# Patient Record
Sex: MALE | Race: WHITE | NOT HISPANIC OR LATINO | Employment: FULL TIME | URBAN - METROPOLITAN AREA
[De-identification: names, ages, dates, MRNs, and addresses within clinical notes are randomized per-mention and may not be internally consistent; named-entity substitution may affect disease eponyms.]

---
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
## 2024-03-26 ENCOUNTER — APPOINTMENT (OUTPATIENT)
Dept: CT IMAGING | Facility: CLINIC | Age: 19
End: 2024-03-26
Attending: EMERGENCY MEDICINE
Payer: COMMERCIAL

## 2024-03-26 ENCOUNTER — HOSPITAL ENCOUNTER (EMERGENCY)
Facility: CLINIC | Age: 19
Discharge: HOME | End: 2024-03-26
Attending: EMERGENCY MEDICINE | Admitting: EMERGENCY MEDICINE
Payer: COMMERCIAL

## 2024-03-26 VITALS
OXYGEN SATURATION: 97 % | DIASTOLIC BLOOD PRESSURE: 85 MMHG | TEMPERATURE: 97.8 F | SYSTOLIC BLOOD PRESSURE: 136 MMHG | BODY MASS INDEX: 26.23 KG/M2 | RESPIRATION RATE: 18 BRPM | HEART RATE: 66 BPM | WEIGHT: 177.6 LBS

## 2024-03-26 DIAGNOSIS — S06.0X0A CONCUSSION WITHOUT LOSS OF CONSCIOUSNESS, INITIAL ENCOUNTER: ICD-10-CM

## 2024-03-26 PROCEDURE — 70450 CT HEAD/BRAIN W/O DYE: CPT

## 2024-03-26 PROCEDURE — 99284 EMERGENCY DEPT VISIT MOD MDM: CPT | Performed by: EMERGENCY MEDICINE

## 2024-03-26 PROCEDURE — 99283 EMERGENCY DEPT VISIT LOW MDM: CPT | Performed by: EMERGENCY MEDICINE

## 2024-03-26 ASSESSMENT — ACTIVITIES OF DAILY LIVING (ADL): ADLS_ACUITY_SCORE: 35

## 2024-03-26 ASSESSMENT — COLUMBIA-SUICIDE SEVERITY RATING SCALE - C-SSRS
6. HAVE YOU EVER DONE ANYTHING, STARTED TO DO ANYTHING, OR PREPARED TO DO ANYTHING TO END YOUR LIFE?: NO
2. HAVE YOU ACTUALLY HAD ANY THOUGHTS OF KILLING YOURSELF IN THE PAST MONTH?: NO
1. IN THE PAST MONTH, HAVE YOU WISHED YOU WERE DEAD OR WISHED YOU COULD GO TO SLEEP AND NOT WAKE UP?: NO

## 2024-03-26 NOTE — LETTER
March 26, 2024      To Whom It May Concern:      Tito Andrade was seen in our Emergency Department today, 03/26/24.  I expect his condition to improve over the next 5 days.  He may return to work when improved.    Sincerely,        Brett Cordova MD

## 2024-03-27 NOTE — ED PROVIDER NOTES
History     Chief Complaint   Patient presents with    Head Injury     HPI  Tito Andrade is a 19 year old male who presents to the emergency department secondary to head injury after hitting head on an auger at work.  This happened at 1030 this morning and it is now 9:30 at night.  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  It has a large casing around the auger and he was moving fast and turned around and hit the front of his head on the auger.  It was a sharp edge but rather flat edge.  He did not sustain a laceration.  He did not have any symptoms prior to hitting his head but afterwards he developed multiple symptoms.  He has had nausea and vomiting sent to, headache and dizziness.  He has a history of multiple concussions when he was younger.  No neck pain no numbness or tingling of all 4 extremities.  He is able to walk but he is a little bit dizzy.  No confusion.  No loss of consciousness.    Allergies:  No Known Allergies    Problem List:    Patient Active Problem List    Diagnosis Date Noted    Pseudophakia of left eye 04/16/2014     Priority: Medium    XT (exotropia) 04/16/2014     Priority: Medium    Posterior capsular opacification visually significant 04/16/2014     Priority: Medium    Trauma 08/28/2012     Priority: Medium    Ruptured globe 08/28/2012     Priority: Medium        Past Medical History:    Past Medical History:   Diagnosis Date    Eye injury - globe rupture        Past Surgical History:    Past Surgical History:   Procedure Laterality Date    EXAM UNDER ANESTHESIA EYE(S)  9/11/2012    Procedure: EXAM UNDER ANESTHESIA EYE(S);;  Surgeon: Sanjiv Ceron MD;  [facility]: [facility] OR    EXAM UNDER ANESTHESIA EYE(S)  10/30/2012    Procedure: EXAM UNDER ANESTHESIA EYE(S);  Eye Exam both eyes, Left Lensectomy, Implant Intraocular Lens in left eye;  Surgeon: Sanjiv Ceron MD;  [facility]    IMPLANT SECONDARY INTRAOCULAR LENS IMPLANT  10/30/2012     Procedure: IMPLANT SECONDARY INTRAOCULAR LENS IMPLANT;;  Surgeon: Sanjiv Ceron MD;  [facility]    LENSECTOMY CHILD  10/30/2012    Procedure: LENSECTOMY CHILD;;  Surgeon: Sanjiv Ceron MD;  [facility]    MOUTH SURGERY      REMOVE SUTURE EYE POST PROCEDURE  9/11/2012    Procedure: REMOVE SUTURE EYE POST PROCEDURE;;  Surgeon: Sanjiv Ceron MD;  [facility]    REPAIR RUPTURED GLOBE  8/27/2012    Procedure: REPAIR RUPTURED GLOBE;  Ruptured globe, repair eye.;  Surgeon: Barahimi, Behin, MD;  [facility]: [facility]    TYMPANOPLASTY CHILD         Family History:    No family history on file.    Social History:  Marital Status:  Single [1]  Social History     Tobacco Use    Smoking status: Never   Substance Use Topics    Alcohol use: No    Drug use: No        Medications:    meloxicam (MOBIC) 15 MG tablet          Review of Systems   All other systems reviewed and are negative.      Physical Exam   BP: 136/85  Pulse: 66  Temp: 97.8  F (36.6  C)  Resp: 18  Weight: 80.6 kg (177 lb 9.6 oz)  SpO2: 97 %      Physical Exam  Vitals and nursing note reviewed.   Constitutional:       General: He is not in acute distress.     Appearance: He is well-developed. He is not diaphoretic.   HENT:      Head: Normocephalic and atraumatic.      Right Ear: External ear normal.      Left Ear: External ear normal.      Nose: No congestion.      Mouth/Throat:      Mouth: Mucous membranes are moist.   Eyes:      General: No scleral icterus.     Conjunctiva/sclera: Conjunctivae normal.      Pupils: Pupils are equal, round, and reactive to light.      Comments: Scarring from previous corneal injury, left eye   Cardiovascular:      Rate and Rhythm: Normal rate and regular rhythm.   Pulmonary:      Effort: Pulmonary effort is normal.   Abdominal:      General: Abdomen is flat.   Musculoskeletal:         General: Normal range of motion.      Cervical back: Normal range of motion and neck supple.      Right lower leg: No edema.      Left  lower leg: No edema.   Skin:     General: Skin is warm and dry.      Capillary Refill: Capillary refill takes less than 2 seconds.      Findings: No rash.   Neurological:      General: No focal deficit present.      Mental Status: He is alert and oriented to person, place, and time.   Psychiatric:         Mood and Affect: Mood normal.         Behavior: Behavior normal.         ED Course        Procedures                Results for orders placed or performed during the hospital encounter of 03/26/24 (from the past 24 hour(s))   Head CT w/o contrast    Narrative    EXAM: CT HEAD W/O CONTRAST  LOCATION: [facility]  DATE: 3/26/2024    INDICATION: Head injury, vomiting, dizziness.  COMPARISON: None.  TECHNIQUE: Routine CT Head without IV contrast. Multiplanar reformats. Dose reduction techniques were used.    FINDINGS:  INTRACRANIAL CONTENTS: No intracranial hemorrhage, extraaxial collection, or mass effect.  No CT evidence of acute infarct. Normal parenchymal attenuation. Normal ventricles and sulci.     VISUALIZED ORBITS/SINUSES/MASTOIDS: No intraorbital abnormality. Mild mucosal thickening in the bilateral ethmoid sinuses. No middle ear or mastoid effusion.    BONES/SOFT TISSUES: No acute abnormality.      Impression    IMPRESSION: No CT findings of acute intracranial process.       Medications - No data to display    Assessments & Plan (with Medical Decision Making)  19-year-old with a head injury and postconcussive symptoms of nausea vomiting headache dizziness.  CT scan of the head ordered and results are reviewed.  No indication for blood work or other imaging at this time.  CT scan is unremarkable.  Patient is stable here in the emergency department.  He was referred to concussion clinic.  Concussion care was discussed.  He was given a work note.  Return to ER precautions and follow-up precautions discussed.  All questions answered prior to discharge.     I have reviewed the  nursing notes.    I have reviewed the findings, diagnosis, plan and need for follow up with the patient.          Discharge Medication List as of 3/26/2024 10:06 PM          Final diagnoses:   Concussion without loss of consciousness, initial encounter       3/26/2024   [facility] EMERGENCY DEPT       Brett Cordova MD  03/26/24 9575

## 2024-03-27 NOTE — DISCHARGE INSTRUCTIONS
"-you have had a concussion  -fortunately the head CT was normal  -I recommend \"brain rest\" which means no doing things that require sustained concentration for extended periods of time such as reading, looking at screens, etc.  -get plenty of rest, this allows the brain to heal.  -I put in a referral to concussion clinic for you to have follow-up.  -it was a pleasure to meet you.     "

## 2024-03-27 NOTE — ED TRIAGE NOTES
Patient presents with head injury after hitting head on auger at work. Patient is nauseous and vomiting x 2, headache, and dizziness. Patient has hx of multiple concussions.

## 2024-06-23 ENCOUNTER — HEALTH MAINTENANCE LETTER (OUTPATIENT)
Age: 19
End: 2024-06-23

## 2024-06-29 ENCOUNTER — HOSPITAL ENCOUNTER (EMERGENCY)
Facility: CLINIC | Age: 19
Discharge: HOME | End: 2024-06-29
Attending: EMERGENCY MEDICINE | Admitting: EMERGENCY MEDICINE
Payer: MEDICAID

## 2024-06-29 VITALS
RESPIRATION RATE: 18 BRPM | WEIGHT: 178 LBS | TEMPERATURE: 98 F | SYSTOLIC BLOOD PRESSURE: 131 MMHG | DIASTOLIC BLOOD PRESSURE: 69 MMHG | OXYGEN SATURATION: 99 % | BODY MASS INDEX: 26.29 KG/M2 | HEART RATE: 70 BPM

## 2024-06-29 DIAGNOSIS — S05.02XA ABRASION OF LEFT CORNEA, INITIAL ENCOUNTER: ICD-10-CM

## 2024-06-29 PROCEDURE — 250N000009 HC RX 250: Performed by: EMERGENCY MEDICINE

## 2024-06-29 PROCEDURE — 99283 EMERGENCY DEPT VISIT LOW MDM: CPT

## 2024-06-29 PROCEDURE — 99284 EMERGENCY DEPT VISIT MOD MDM: CPT | Performed by: EMERGENCY MEDICINE

## 2024-06-29 RX ORDER — TETRACAINE HYDROCHLORIDE 5 MG/ML
1-2 SOLUTION OPHTHALMIC ONCE
Status: COMPLETED | OUTPATIENT
Start: 2024-06-29 | End: 2024-06-29

## 2024-06-29 RX ORDER — TOBRAMYCIN 3 MG/ML
1-2 SOLUTION/ DROPS OPHTHALMIC
Qty: 5 ML | Refills: 0 | Status: SHIPPED | OUTPATIENT
Start: 2024-06-29 | End: 2024-07-06

## 2024-06-29 RX ADMIN — TETRACAINE HYDROCHLORIDE 2 DROP: 5 SOLUTION OPHTHALMIC at 14:56

## 2024-06-29 ASSESSMENT — COLUMBIA-SUICIDE SEVERITY RATING SCALE - C-SSRS
1. IN THE PAST MONTH, HAVE YOU WISHED YOU WERE DEAD OR WISHED YOU COULD GO TO SLEEP AND NOT WAKE UP?: NO
6. HAVE YOU EVER DONE ANYTHING, STARTED TO DO ANYTHING, OR PREPARED TO DO ANYTHING TO END YOUR LIFE?: NO
2. HAVE YOU ACTUALLY HAD ANY THOUGHTS OF KILLING YOURSELF IN THE PAST MONTH?: NO

## 2024-06-29 ASSESSMENT — ACTIVITIES OF DAILY LIVING (ADL): ADLS_ACUITY_SCORE: 35

## 2024-06-29 NOTE — ED TRIAGE NOTES
Pt presents with concern for left eye injury after he was wrestling with his dog last night. He states the dogs claw got behind his glasses and scratched the inside of his eyelid. Pt has hx of global rupture and just wanted to get his eye checked.

## 2024-06-29 NOTE — DISCHARGE INSTRUCTIONS
Return to the emergency department if you develop new or worsening symptoms.  You had a small abrasion of the cornea on the left side.  That should get better with time.  You can use the antibiotic drops which will help as well.  I hope you get better quickly.

## 2024-06-29 NOTE — ED PROVIDER NOTES
History     Chief Complaint   Patient presents with    Eye Injury     HPI  Tito Andrade is a 19 year old male who presents to the emergency department secondary to left upper eyelid injury that occurred last night when he was wrestling with his dog.  The dog's claw got in between his glasses and his eye and scratched the inside of his upper left upper eyelid.  He just wanted to get checked because he had an eye injury previously.  His vision has not changed.  He does not have a headache.  There is been no pustular discharge.    Allergies:  No Known Allergies    Problem List:    Patient Active Problem List    Diagnosis Date Noted    Pseudophakia of left eye 04/16/2014     Priority: Medium    XT (exotropia) 04/16/2014     Priority: Medium    Posterior capsular opacification visually significant 04/16/2014     Priority: Medium    Trauma 08/28/2012     Priority: Medium    Ruptured globe 08/28/2012     Priority: Medium        Past Medical History:    Past Medical History:   Diagnosis Date    Eye injury - globe rupture        Past Surgical History:    Past Surgical History:   Procedure Laterality Date    EXAM UNDER ANESTHESIA EYE(S)  9/11/2012    Procedure: EXAM UNDER ANESTHESIA EYE(S);;  Surgeon: Sanjiv Ceron MD;  [facility]    EXAM UNDER ANESTHESIA EYE(S)  10/30/2012    Procedure: EXAM UNDER ANESTHESIA EYE(S);  Eye Exam both eyes, Left Lensectomy, Implant Intraocular Lens in left eye;  Surgeon: Sanjiv Ceron MD;  [facility]    IMPLANT SECONDARY INTRAOCULAR LENS IMPLANT  10/30/2012    Procedure: IMPLANT SECONDARY INTRAOCULAR LENS IMPLANT;;  Surgeon: Sanjiv Ceron MD;  [facility]    LENSECTOMY CHILD  10/30/2012    Procedure: LENSECTOMY CHILD;;  Surgeon: Sanjiv Ceron MD;  [facility]    MOUTH SURGERY      REMOVE SUTURE EYE POST PROCEDURE  9/11/2012    Procedure: REMOVE SUTURE EYE POST PROCEDURE;;  Surgeon: Sanjiv Ceron MD;  [facility]    REPAIR RUPTURED GLOBE  8/27/2012     Procedure: REPAIR RUPTURED GLOBE;  Ruptured globe, repair eye.;  Surgeon: Barahimi, Behin, MD;  [facility]    TYMPANOPLASTY CHILD         Family History:    No family history on file.    Social History:  Marital Status:  Single [1]  Social History     Tobacco Use    Smoking status: Never   Substance Use Topics    Alcohol use: No    Drug use: No        Medications:    tobramycin (TOBREX) 0.3 % ophthalmic solution  meloxicam (MOBIC) 15 MG tablet          Review of Systems   All other systems reviewed and are negative.      Physical Exam   BP: 131/69  Pulse: 70  Temp: 98  F (36.7  C)  Resp: 18  Weight: 80.7 kg (178 lb)  SpO2: 99 %      Physical Exam  Vitals and nursing note reviewed.   Constitutional:       General: He is not in acute distress.     Appearance: He is well-developed. He is not diaphoretic.   HENT:      Head: Normocephalic and atraumatic.      Right Ear: External ear normal.      Left Ear: External ear normal.      Nose: Nose normal. No congestion.      Mouth/Throat:      Mouth: Mucous membranes are moist.      Pharynx: Oropharynx is clear.   Eyes:      General: No scleral icterus.     Extraocular Movements: Extraocular movements intact.      Conjunctiva/sclera: Conjunctivae normal.      Comments: Slight left conjunctival injection.  Small linear scar over the middle of the left eye.  No foreign bodies.  No lacerations.  Abnormally shaped left pupil from previous traumatic eye injury.   Cardiovascular:      Rate and Rhythm: Normal rate.   Pulmonary:      Effort: Pulmonary effort is normal.   Abdominal:      General: Abdomen is flat.   Musculoskeletal:         General: No tenderness or deformity. Normal range of motion.      Cervical back: Normal range of motion and neck supple.   Lymphadenopathy:      Cervical: No cervical adenopathy.   Skin:     General: Skin is warm and dry.      Capillary Refill: Capillary refill takes less than 2 seconds.      Findings: No rash.   Neurological:      General: No  focal deficit present.      Mental Status: He is alert and oriented to person, place, and time.      Cranial Nerves: No cranial nerve deficit.      Sensory: No sensory deficit.      Coordination: Coordination normal.   Psychiatric:         Behavior: Behavior normal.         ED Course        Procedures      Tetracaine eyedrops were put in the patient's left eye.  Patient had excellent improvement of his symptoms with the drops.  The underside of the eyelid was inspected.  There is no foreign bodies or lacerations or significant abnormalities.  Fluorescein dye was placed on the eye and the patient was reexamined with  Woods lamp.  Patient had a small punctate corneal abrasion in the center of his eye.       No results found for this or any previous visit (from the past 24 hour(s)).    Medications   tetracaine (PONTOCAINE) 0.5 % ophthalmic solution 1-2 drop (2 drops Left Eye $Given 6/29/24 4669)       Assessments & Plan (with Medical Decision Making)  19-year-old with a left eye injury.  Tetracaine drops were infiltrated into the eye.  Fluorescein staining reveals a small corneal abrasion on the left eye.  Patient be discharged home with tobramycin eyedrops and follow-up precautions.  I did not see any new abnormalities on exam.  Patient does have a scar from previous traumatic eye injury with globe rupture and does have an abnormal shaped pupil which is not new.  There is no new disruption in his vision.  Patient is discharged home in stable condition.  All questions answered prior to discharge.     I have reviewed the nursing notes.    I have reviewed the findings, diagnosis, plan and need for follow up with the patient.        New Prescriptions    TOBRAMYCIN (TOBREX) 0.3 % OPHTHALMIC SOLUTION    Place 1-2 drops Into the left eye every 2 hours for 7 days       Final diagnoses:   Abrasion of left cornea, initial encounter       6/29/2024   [facility] EMERGENCY DEPT       Brett Cordova,  MD  06/29/24 1519

## 2024-07-01 ENCOUNTER — PATIENT OUTREACH (OUTPATIENT)
Dept: FAMILY MEDICINE | Facility: CLINIC | Age: 19
End: 2024-07-01
Payer: COMMERCIAL

## 2024-07-01 NOTE — TELEPHONE ENCOUNTER
Transitions of Care Outreach  Chief Complaint   Patient presents with    Hospital F/U       Most Recent Admission Date: 6/29/2024   Most Recent Admission Diagnosis:      Most Recent Discharge Date: 6/29/2024   Most Recent Discharge Diagnosis: Abrasion of left cornea, initial encounter - S05.02XA     Transitions of Care Assessment    Discharge Assessment  How are you doing now that you are home?: better  How are your symptoms? (Red Flag symptoms escalate to triage hotline per guidelines): Improved  Do you know how to contact your clinic care team if you have future questions or changes to your health status? : Yes  Does the patient have their discharge instructions? : Yes  Does the patient have questions regarding their discharge instructions? : No  Were you started on any new medications or were there changes to any of your previous medications? : Yes  Does the patient have all of their medications?: Yes  Do you have questions regarding any of your medications? : No  Do you have all of your needed medical supplies or equipment (DME)?  (i.e. oxygen tank, CPAP, cane, etc.): Yes    Follow up Plan     Discharge Follow-Up  Discharge follow up appointment scheduled in alignment with recommended follow up timeframe or Transitions of Risk Category? (Low = within 30 days; Moderate= within 14 days; High= within 7 days): No  Patient's follow up appointment not scheduled: Patient accepted scheduling support. Appt scheduled/requested per protocol.    No future appointments.    Outpatient Plan as outlined on AVS reviewed with patient.    For any urgent concerns, please contact our 24 hour nurse triage line: [phone] ([phone])       Ally Harvey RN

## 2024-09-11 ENCOUNTER — APPOINTMENT (OUTPATIENT)
Dept: GENERAL RADIOLOGY | Facility: CLINIC | Age: 19
End: 2024-09-11
Attending: STUDENT IN AN ORGANIZED HEALTH CARE EDUCATION/TRAINING PROGRAM
Payer: COMMERCIAL

## 2024-09-11 ENCOUNTER — HOSPITAL ENCOUNTER (EMERGENCY)
Facility: CLINIC | Age: 19
Discharge: HOME | End: 2024-09-11
Attending: STUDENT IN AN ORGANIZED HEALTH CARE EDUCATION/TRAINING PROGRAM | Admitting: STUDENT IN AN ORGANIZED HEALTH CARE EDUCATION/TRAINING PROGRAM
Payer: COMMERCIAL

## 2024-09-11 VITALS
BODY MASS INDEX: 26.06 KG/M2 | WEIGHT: 176.5 LBS | DIASTOLIC BLOOD PRESSURE: 71 MMHG | SYSTOLIC BLOOD PRESSURE: 119 MMHG | HEART RATE: 73 BPM | TEMPERATURE: 98.1 F | RESPIRATION RATE: 17 BRPM | OXYGEN SATURATION: 98 %

## 2024-09-11 DIAGNOSIS — R07.9 NONSPECIFIC CHEST PAIN: ICD-10-CM

## 2024-09-11 LAB
ALBUMIN SERPL BCG-MCNC: 4.3 G/DL (ref 3.5–5.2)
ALP SERPL-CCNC: 102 U/L (ref 40–150)
ALT SERPL W P-5'-P-CCNC: 31 U/L (ref 0–70)
ANION GAP SERPL CALCULATED.3IONS-SCNC: 16 MMOL/L (ref 7–15)
AST SERPL W P-5'-P-CCNC: 32 U/L (ref 0–45)
BASOPHILS # BLD AUTO: 0.1 10E3/UL (ref 0–0.2)
BASOPHILS NFR BLD AUTO: 1 %
BILIRUB SERPL-MCNC: 0.3 MG/DL
BUN SERPL-MCNC: 12 MG/DL (ref 6–20)
CALCIUM SERPL-MCNC: 9.7 MG/DL (ref 8.8–10.4)
CHLORIDE SERPL-SCNC: 104 MMOL/L (ref 98–107)
CREAT SERPL-MCNC: 0.78 MG/DL (ref 0.67–1.17)
EGFRCR SERPLBLD CKD-EPI 2021: >90 ML/MIN/1.73M2
EOSINOPHIL # BLD AUTO: 0.6 10E3/UL (ref 0–0.7)
EOSINOPHIL NFR BLD AUTO: 7 %
ERYTHROCYTE [DISTWIDTH] IN BLOOD BY AUTOMATED COUNT: 12.3 % (ref 10–15)
GLUCOSE SERPL-MCNC: 113 MG/DL (ref 70–99)
HCO3 SERPL-SCNC: 22 MMOL/L (ref 22–29)
HCT VFR BLD AUTO: 41 % (ref 40–53)
HGB BLD-MCNC: 14.2 G/DL (ref 13.3–17.7)
IMM GRANULOCYTES # BLD: 0 10E3/UL
IMM GRANULOCYTES NFR BLD: 0 %
LYMPHOCYTES # BLD AUTO: 3.7 10E3/UL (ref 0.8–5.3)
LYMPHOCYTES NFR BLD AUTO: 40 %
MCH RBC QN AUTO: 29.6 PG (ref 26.5–33)
MCHC RBC AUTO-ENTMCNC: 34.6 G/DL (ref 31.5–36.5)
MCV RBC AUTO: 85 FL (ref 78–100)
MONOCYTES # BLD AUTO: 0.7 10E3/UL (ref 0–1.3)
MONOCYTES NFR BLD AUTO: 7 %
NEUTROPHILS # BLD AUTO: 4.1 10E3/UL (ref 1.6–8.3)
NEUTROPHILS NFR BLD AUTO: 45 %
NRBC # BLD AUTO: 0 10E3/UL
NRBC BLD AUTO-RTO: 0 /100
PLATELET # BLD AUTO: 237 10E3/UL (ref 150–450)
POTASSIUM SERPL-SCNC: 3.8 MMOL/L (ref 3.4–5.3)
PROT SERPL-MCNC: 7.2 G/DL (ref 6.4–8.3)
RBC # BLD AUTO: 4.8 10E6/UL (ref 4.4–5.9)
SODIUM SERPL-SCNC: 142 MMOL/L (ref 135–145)
TROPONIN T SERPL HS-MCNC: <6 NG/L
WBC # BLD AUTO: 9.1 10E3/UL (ref 4–11)

## 2024-09-11 PROCEDURE — 93005 ELECTROCARDIOGRAM TRACING: CPT | Performed by: STUDENT IN AN ORGANIZED HEALTH CARE EDUCATION/TRAINING PROGRAM

## 2024-09-11 PROCEDURE — 84484 ASSAY OF TROPONIN QUANT: CPT | Performed by: STUDENT IN AN ORGANIZED HEALTH CARE EDUCATION/TRAINING PROGRAM

## 2024-09-11 PROCEDURE — 99284 EMERGENCY DEPT VISIT MOD MDM: CPT | Performed by: STUDENT IN AN ORGANIZED HEALTH CARE EDUCATION/TRAINING PROGRAM

## 2024-09-11 PROCEDURE — 99285 EMERGENCY DEPT VISIT HI MDM: CPT | Mod: 25 | Performed by: STUDENT IN AN ORGANIZED HEALTH CARE EDUCATION/TRAINING PROGRAM

## 2024-09-11 PROCEDURE — 36415 COLL VENOUS BLD VENIPUNCTURE: CPT | Performed by: STUDENT IN AN ORGANIZED HEALTH CARE EDUCATION/TRAINING PROGRAM

## 2024-09-11 PROCEDURE — 82040 ASSAY OF SERUM ALBUMIN: CPT | Performed by: STUDENT IN AN ORGANIZED HEALTH CARE EDUCATION/TRAINING PROGRAM

## 2024-09-11 PROCEDURE — 93010 ELECTROCARDIOGRAM REPORT: CPT | Performed by: STUDENT IN AN ORGANIZED HEALTH CARE EDUCATION/TRAINING PROGRAM

## 2024-09-11 PROCEDURE — 85025 COMPLETE CBC W/AUTO DIFF WBC: CPT | Performed by: STUDENT IN AN ORGANIZED HEALTH CARE EDUCATION/TRAINING PROGRAM

## 2024-09-11 PROCEDURE — 71046 X-RAY EXAM CHEST 2 VIEWS: CPT

## 2024-09-11 ASSESSMENT — COLUMBIA-SUICIDE SEVERITY RATING SCALE - C-SSRS
2. HAVE YOU ACTUALLY HAD ANY THOUGHTS OF KILLING YOURSELF IN THE PAST MONTH?: NO
1. IN THE PAST MONTH, HAVE YOU WISHED YOU WERE DEAD OR WISHED YOU COULD GO TO SLEEP AND NOT WAKE UP?: NO
6. HAVE YOU EVER DONE ANYTHING, STARTED TO DO ANYTHING, OR PREPARED TO DO ANYTHING TO END YOUR LIFE?: NO

## 2024-09-11 ASSESSMENT — ACTIVITIES OF DAILY LIVING (ADL): ADLS_ACUITY_SCORE: 35

## 2024-09-11 NOTE — Clinical Note
Tito Andrade was seen and treated in our emergency department on 9/11/2024.  He may return to work on 09/12/2024.       If you have any questions or concerns, please don't hesitate to call.      Devante Hi MD

## 2024-09-12 ENCOUNTER — ORDERS ONLY (AUTO-RELEASED) (OUTPATIENT)
Dept: EMERGENCY MEDICINE | Facility: CLINIC | Age: 19
End: 2024-09-12
Payer: COMMERCIAL

## 2024-09-12 DIAGNOSIS — R07.9 NONSPECIFIC CHEST PAIN: ICD-10-CM

## 2024-09-12 NOTE — DISCHARGE INSTRUCTIONS
"Your testing today was all very reassuring.  I do not see any signs of acute heart or lung stress.    Your EKG is not entirely normal, but it is unchanged compared to September and I think this is probably \"normal\" for you.    Use Tylenol/ibuprofen for discomfort.  I do think it would be worthwhile for you to have a Zio patch monitor for a few days to look for any heart rhythm problems.  You will need to schedule an appointment in the clinic for follow-up and to discuss these results if you decide to proceed.  Referral for that was provided as well.    Return to the emergency department sooner for any new or worsening symptoms.  "

## 2024-09-12 NOTE — ED PROVIDER NOTES
History     Chief Complaint   Patient presents with    Chest Pain     HPI  Tito Andrade is a 19 year old male with no relevant medical history who presents for evaluation of shortness of breath and chest pain.  Patient describes being evaluated in the emergency department toward the end of last year for a similar episode.  Since then he describes transient shortness of breath, but has never found a clear cause.  Then this afternoon he noticed worsening shortness of breath associated with some substernal chest pressure.  He denies any injury or strain to the area.  He has no known cardiac history, breathing issues such as asthma, or any prior DVT/PE.  Patient further denies having any fever, chills, cough or cold symptoms, abdominal pain, pain or swelling of the legs, other complaints today.    Allergies:  No Known Allergies    Problem List:    Patient Active Problem List    Diagnosis Date Noted    Pseudophakia of left eye 04/16/2014     Priority: Medium    XT (exotropia) 04/16/2014     Priority: Medium    Posterior capsular opacification visually significant 04/16/2014     Priority: Medium    Trauma 08/28/2012     Priority: Medium    Ruptured globe 08/28/2012     Priority: Medium      Past Medical History:    Past Medical History:   Diagnosis Date    Eye injury - globe rupture      Past Surgical History:    Past Surgical History:   Procedure Laterality Date    EXAM UNDER ANESTHESIA EYE(S)  9/11/2012    Procedure: EXAM UNDER ANESTHESIA EYE(S);;  Surgeon: Sanjiv Ceron MD;  Location: [facility]    EXAM UNDER ANESTHESIA EYE(S)  10/30/2012    Procedure: EXAM UNDER ANESTHESIA EYE(S);  Eye Exam both eyes, Left Lensectomy, Implant Intraocular Lens in left eye;  Surgeon: Sanjiv Ceron MD;  Location: [facility]    IMPLANT SECONDARY INTRAOCULAR LENS IMPLANT  10/30/2012    Procedure: IMPLANT SECONDARY INTRAOCULAR LENS IMPLANT;;  Surgeon: Sanjiv Ceron MD;  Location: [facility]    LENSECTOMY CHILD  10/30/2012    Procedure:  LENSECTOMY CHILD;;  Surgeon: Sanjiv Ceron MD;  Location: [facility]    MOUTH SURGERY      REMOVE SUTURE EYE POST PROCEDURE  9/11/2012    Procedure: REMOVE SUTURE EYE POST PROCEDURE;;  Surgeon: Sanjiv Ceron MD;  Location: [facility]    REPAIR RUPTURED GLOBE  8/27/2012    Procedure: REPAIR RUPTURED GLOBE;  Ruptured globe, repair eye.;  Surgeon: Barahimi, Behin, MD;  Location: [facility]    TYMPANOPLASTY CHILD       Family History:    No family history on file.    Social History:  Marital Status:  Single [1]  Social History     Tobacco Use    Smoking status: Never   Substance Use Topics    Alcohol use: No    Drug use: No      Medications:    meloxicam (MOBIC) 15 MG tablet      Review of Systems   All other systems reviewed and are negative.  See HPI.    Physical Exam   BP: 136/78  Pulse: 72  Temp: 98.1  F (36.7  C)  Resp: 20  Weight: 80.1 kg (176 lb 8 oz)  SpO2: 99 %    Physical Exam  Vitals and nursing note reviewed.   Constitutional:       General: He is not in acute distress.     Appearance: Normal appearance. He is well-developed. He is not toxic-appearing.      Comments: Anxious.  Otherwise no acute distress.   HENT:      Head: Atraumatic.   Eyes:      General: No scleral icterus.     Conjunctiva/sclera: Conjunctivae normal.   Neck:      Vascular: No JVD.   Cardiovascular:      Rate and Rhythm: Normal rate and regular rhythm.      Pulses:           Radial pulses are 2+ on the right side and 2+ on the left side.        Dorsalis pedis pulses are 2+ on the right side and 2+ on the left side.      Heart sounds: Normal heart sounds. No murmur heard.  Pulmonary:      Effort: Pulmonary effort is normal. No respiratory distress.      Breath sounds: Normal breath sounds. No decreased breath sounds, wheezing or rhonchi.   Chest:      Chest wall: No tenderness or crepitus.   Abdominal:      Palpations: Abdomen is soft.      Tenderness: There is no abdominal tenderness.   Musculoskeletal:         General: No deformity. Normal range  of motion.      Cervical back: Normal range of motion and neck supple.      Right lower leg: No tenderness. No edema.      Left lower leg: No tenderness. No edema.   Skin:     General: Skin is warm.      Capillary Refill: Capillary refill takes less than 2 seconds.      Coloration: Skin is not pale.   Neurological:      General: No focal deficit present.      Mental Status: He is alert and oriented to person, place, and time.   Psychiatric:         Mood and Affect: Mood is anxious.         ED Course        Procedures       EKG performed at 1917.  Sinus rhythm, rate 68.  Normal axis.  Short MI.  Otherwise normal intervals.  Nonspecific ST changes, likely early repolarization.  Exam independently interpreted by me.  Very similar ST morphology was seen on EKG from 9/10/2023.       Results for orders placed or performed during the hospital encounter of 09/11/24 (from the past 24 hour(s))   CBC with platelets differential    Narrative    The following orders were created for panel order CBC with platelets differential.  Procedure                               Abnormality         Status                     ---------                               -----------         ------                     CBC with platelets and d...[[id number]]                      Final result                 Please view results for these tests on the individual orders.   Comprehensive metabolic panel   Result Value Ref Range    Sodium 142 135 - 145 mmol/L    Potassium 3.8 3.4 - 5.3 mmol/L    Carbon Dioxide (CO2) 22 22 - 29 mmol/L    Anion Gap 16 (H) 7 - 15 mmol/L    Urea Nitrogen 12.0 6.0 - 20.0 mg/dL    Creatinine 0.78 0.67 - 1.17 mg/dL    GFR Estimate >90 >60 mL/min/1.73m2    Calcium 9.7 8.8 - 10.4 mg/dL    Chloride 104 98 - 107 mmol/L    Glucose 113 (H) 70 - 99 mg/dL    Alkaline Phosphatase 102 40 - 150 U/L    AST 32 0 - 45 U/L    ALT 31 0 - 70 U/L    Protein Total 7.2 6.4 - 8.3 g/dL    Albumin 4.3 3.5 - 5.2 g/dL    Bilirubin Total 0.3 <=1.2 mg/dL    Troponin T, High Sensitivity   Result Value Ref Range    Troponin T, High Sensitivity <6 <=22 ng/L   CBC with platelets and differential   Result Value Ref Range    WBC Count 9.1 4.0 - 11.0 10e3/uL    RBC Count 4.80 4.40 - 5.90 10e6/uL    Hemoglobin 14.2 13.3 - 17.7 g/dL    Hematocrit 41.0 40.0 - 53.0 %    MCV 85 78 - 100 fL    MCH 29.6 26.5 - 33.0 pg    MCHC 34.6 31.5 - 36.5 g/dL    RDW 12.3 10.0 - 15.0 %    Platelet Count 237 150 - 450 10e3/uL    % Neutrophils 45 %    % Lymphocytes 40 %    % Monocytes 7 %    % Eosinophils 7 %    % Basophils 1 %    % Immature Granulocytes 0 %    NRBCs per 100 WBC 0 <1 /100    Absolute Neutrophils 4.1 1.6 - 8.3 10e3/uL    Absolute Lymphocytes 3.7 0.8 - 5.3 10e3/uL    Absolute Monocytes 0.7 0.0 - 1.3 10e3/uL    Absolute Eosinophils 0.6 0.0 - 0.7 10e3/uL    Absolute Basophils 0.1 0.0 - 0.2 10e3/uL    Absolute Immature Granulocytes 0.0 <=0.4 10e3/uL    Absolute NRBCs 0.0 10e3/uL   XR Chest 2 Views    Narrative    EXAM: XR CHEST 2 VIEWS  LOCATION: [facility]  DATE: 9/11/2024    INDICATION: Sternal chest pressure, shortness of breath  COMPARISON: 9/10/2023      Impression    IMPRESSION: Negative chest.       Medications - No data to display    Assessments & Plan (with Medical Decision Making)     I have reviewed the nursing notes.    I have reviewed the findings, diagnosis, plan and need for follow up with the patient.  Medical Decision Making  Tito Andrade is a 19 year old male with no relevant medical history who presents for evaluation of shortness of breath and chest pain.  Normal vitals.  Patient appears a bit anxious but is otherwise in no distress.  Exam reveals clear lung sounds, nontender chest wall, equal pulses throughout, no lower extremity or calf tenderness present.  Differential includes musculoskeletal strain, pleurisy, pneumonia, pneumothorax.  Less likely representative of ACS or aortic pathology based on age and reassuring risk  profile.  He is also PERC negative, very low probability of PE.    EKG revealed a sinus rhythm with nonspecific ST changes, likely early repolarization, similar in appearance to study from September 2023.  Short AL is also seen.  Since I highly doubt primary cardiac cause, will hold off on empiric aspirin.  Labs showed no leukocytosis or anemia.  Electrolytes and transaminases were unremarkable.  Troponin was negative.  I tend to doubt dysrhythmia as well, but with transient episodes of shortness of breath and shortened AL, will order ZIO monitoring for the patient.  Unfortunately, he does not have a primary care physician at the moment, so I will also place a urgent referral in order to continue monitoring the situation and follow-up on Zio patch results.  Patient was in agreement with this plan.  He further agrees to come back for any new or worsening symptoms.    Discharge Medication List as of 9/11/2024  8:30 PM        Final diagnoses:   Nonspecific chest pain     9/11/2024   [facility] EMERGENCY DEPT       Devante Hi MD  09/11/24 [id number]     07-May-2024 15:54

## 2024-09-12 NOTE — ED TRIAGE NOTES
Patient presents with concern for chest pain that started 30 min ago and SOB that has been ongoing for a few months.

## 2024-09-24 PROCEDURE — 93227 XTRNL ECG REC<48 HR R&I: CPT | Performed by: INTERNAL MEDICINE

## 2024-10-04 ENCOUNTER — OFFICE VISIT (OUTPATIENT)
Dept: INTERNAL MEDICINE | Facility: CLINIC | Age: 19
End: 2024-10-04
Attending: STUDENT IN AN ORGANIZED HEALTH CARE EDUCATION/TRAINING PROGRAM
Payer: COMMERCIAL

## 2024-10-04 VITALS
SYSTOLIC BLOOD PRESSURE: 121 MMHG | DIASTOLIC BLOOD PRESSURE: 70 MMHG | HEART RATE: 68 BPM | BODY MASS INDEX: 24.73 KG/M2 | TEMPERATURE: 96.9 F | OXYGEN SATURATION: 98 % | HEIGHT: 72 IN | WEIGHT: 182.6 LBS | RESPIRATION RATE: 17 BRPM

## 2024-10-04 DIAGNOSIS — R07.89 CHEST WALL PAIN: ICD-10-CM

## 2024-10-04 DIAGNOSIS — R07.9 NONSPECIFIC CHEST PAIN: Primary | ICD-10-CM

## 2024-10-04 PROCEDURE — G2211 COMPLEX E/M VISIT ADD ON: HCPCS | Performed by: INTERNAL MEDICINE

## 2024-10-04 PROCEDURE — 99214 OFFICE O/P EST MOD 30 MIN: CPT | Performed by: INTERNAL MEDICINE

## 2024-10-04 RX ORDER — PREDNISONE 20 MG/1
TABLET ORAL
Qty: 12 TABLET | Refills: 0 | Status: SHIPPED | OUTPATIENT
Start: 2024-10-04

## 2024-10-04 ASSESSMENT — PAIN SCALES - GENERAL: PAINLEVEL: MILD PAIN (3)

## 2024-10-04 NOTE — PROGRESS NOTES
Assessment & Plan   Problem List Items Addressed This Visit    None  Visit Diagnoses       Nonspecific chest pain    -  Primary    Relevant Medications    predniSONE (DELTASONE) 20 MG tablet    Other Relevant Orders    CT Chest w/o Contrast    Chest wall pain        Relevant Medications    predniSONE (DELTASONE) 20 MG tablet    Other Relevant Orders    CT Chest w/o Contrast           Chest pain has been there for over a year.  Comes and goes at times.  Maybe a little better with some ibuprofen.  Has had a couple ER visits.  Negative chest x-ray negative EKG.  Because of the duration I want to get a chest CT to make sure there is no cause of this I think is just irritation around the sternum will try some treatment with prednisone to calm down the inflammation.    Reviewed ER notes, labs, and xrays.      MED REC REQUIRED  Post Medication Reconciliation Status: discharge medications reconciled and changed, per note/orders    The longitudinal plan of care for the diagnosis(es)/condition(s) as documented were addressed during this visit. Due to the added complexity in care, I will continue to support Tito in the subsequent management and with ongoing continuity of care.      Subjective   Tito is a 19 year old, presenting for the following health issues:  ER F/U (Chest pain)        10/4/2024     1:57 PM   Additional Questions   Roomed by Sania     \[facility] [place] [facility]\""       ED/UC Followup:    Facility:  [facility] Emergency Dept   Date of visit: 09/11/2024  Reason for visit: Chest pain  Current Status: Chest pain has not improved     Has had chest pain for almost a year, had some ibuprofen like medication no help.    Negative ziopatch but only had on a day,     Has pain and sob.  Just takes time to resolves 10-20 minutes, no improvement with rest.  Can happen at rest.    No cough, no sputum, no fevers. No rash.      No tobacco, no alcohol, no marijuana.      EKG was ok, troponin completely normal.            "  Objective    /70 (BP Location: Right arm, Patient Position: Sitting, Cuff Size: Adult Regular)   Pulse 68   Temp 96.9  F (36.1  C) (Temporal)   Resp 17   Ht 1.83 m (6' 0.05\")   Wt 82.8 kg (182 lb 9.6 oz)   SpO2 98%   BMI 24.73 kg/m    Body mass index is 24.73 kg/m .  Physical Exam   GENERAL: alert and no distress  NECK: no adenopathy, no asymmetry, masses, or scars  RESP: lungs clear to auscultation - no rales, rhonchi or wheezes  CV: regular rate and rhythm, normal S1 S2, no S3 or S4, no murmur, click or rub, no peripheral edema  ABDOMEN: soft, nontender, no hepatosplenomegaly, no masses and bowel sounds normal  MS: no gross musculoskeletal defects noted, no edema    Tender over the sternum             Signed Electronically by: Alon Boston MD    "

## 2024-10-22 ENCOUNTER — HOSPITAL ENCOUNTER (OUTPATIENT)
Dept: CT IMAGING | Facility: CLINIC | Age: 19
Discharge: HOME | End: 2024-10-22
Attending: INTERNAL MEDICINE | Admitting: INTERNAL MEDICINE
Payer: COMMERCIAL

## 2024-10-22 DIAGNOSIS — R07.9 NONSPECIFIC CHEST PAIN: ICD-10-CM

## 2024-10-22 DIAGNOSIS — R07.89 CHEST WALL PAIN: ICD-10-CM

## 2024-10-22 PROCEDURE — 250N000011 HC RX IP 250 OP 636: Performed by: INTERNAL MEDICINE

## 2024-10-22 PROCEDURE — 71260 CT THORAX DX C+: CPT

## 2024-10-22 PROCEDURE — 250N000009 HC RX 250: Performed by: INTERNAL MEDICINE

## 2024-10-22 RX ORDER — IOPAMIDOL 755 MG/ML
500 INJECTION, SOLUTION INTRAVASCULAR ONCE
Status: COMPLETED | OUTPATIENT
Start: 2024-10-22 | End: 2024-10-22

## 2024-10-22 RX ADMIN — IOPAMIDOL 81 ML: 755 INJECTION, SOLUTION INTRAVENOUS at 16:10

## 2024-10-22 RX ADMIN — SODIUM CHLORIDE 60 ML: 9 INJECTION, SOLUTION INTRAVENOUS at 16:10

## 2025-07-12 ENCOUNTER — HEALTH MAINTENANCE LETTER (OUTPATIENT)
Age: 20
End: 2025-07-12